# Patient Record
Sex: FEMALE | Race: WHITE
[De-identification: names, ages, dates, MRNs, and addresses within clinical notes are randomized per-mention and may not be internally consistent; named-entity substitution may affect disease eponyms.]

---

## 2019-01-26 ENCOUNTER — HOSPITAL ENCOUNTER (EMERGENCY)
Dept: HOSPITAL 25 - UCCORT | Age: 29
Discharge: HOME | End: 2019-01-26
Payer: COMMERCIAL

## 2019-01-26 VITALS — DIASTOLIC BLOOD PRESSURE: 68 MMHG | SYSTOLIC BLOOD PRESSURE: 120 MMHG

## 2019-01-26 DIAGNOSIS — S61.012A: Primary | ICD-10-CM
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
## 2019-01-26 DIAGNOSIS — Z23: ICD-10-CM
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
## 2019-01-26 PROCEDURE — G0463 HOSPITAL OUTPT CLINIC VISIT: HCPCS

## 2019-01-26 PROCEDURE — 99202 OFFICE O/P NEW SF 15 MIN: CPT

## 2019-01-26 PROCEDURE — 90471 IMMUNIZATION ADMIN: CPT

## 2019-01-26 PROCEDURE — 90715 TDAP VACCINE 7 YRS/> IM: CPT

## 2019-01-26 PROCEDURE — 12001 RPR S/N/AX/GEN/TRNK 2.5CM/<: CPT

## 2019-01-26 NOTE — UC
Laceration HPI





- HPI Summary


HPI Summary: 


Cut her left thumb this evening chopping vegetables. Unknown tetanus status





- History Of Current Complaint


Chief Complaint: UCLaceration


Stated Complaint: LEFT THUMB LAC


Time Seen by Provider: 01/26/19 20:33


Hx Obtained From: Patient


Hx Last Menstrual Period: 1/26/19


Laceration Location: Finger - left thumb


Mechanism Of Injury: Sharp Trauma


Onset/Duration: Sudden Onset, Still Present


Severity: Mild


Pain Intensity: 3


Aggravating Factors: Movement


Hands: 


  __________________________














  __________________________





 1 - laceration 1.9 cm





Related History: Dominant Hand Right





- Allergies/Home Medications


Allergies/Adverse Reactions: 


 Allergies











Allergy/AdvReac Type Severity Reaction Status Date / Time


 


No Known Allergies Allergy   Verified 01/26/19 20:18











Home Medications: 


 Home Medications





Adrenal Complex 1 tab PO DAILY 01/26/19 [History Confirmed 01/26/19]


Amino AC/Whey Prot Conc, Isol [Whey Protein Powder] 1 pow PO DAILY 01/26/19 [

History Confirmed 01/26/19]


Anxiety Tincture 1 udc PO DAILY PRN 01/26/19 [History Confirmed 01/26/19]


B1/B2/Niacin/B12/Protease [B-Complex with B-12 Tablet] 1 tab PO DAILY 01/26/19 [

History Confirmed 01/26/19]


Bio Feno F Vitamin 1 tab PO DAILY 01/26/19 [History Confirmed 01/26/19]


Black [place] Seed Oil 1 cap PO DAILY 01/26/19 [History Confirmed 01/26/19]


Calming Powder 1 udc PO DAILY PRN 01/26/19 [History Confirmed 01/26/19]


Cholecalciferol (Vitamin D3) [Vitamin D3] 1,000 unit PO DAILY 01/26/19 [History 

Confirmed 01/26/19]


Cool Digestive Fire 1 udc PO DAILY 01/26/19 [History Confirmed 01/26/19]


Digestive Biophenotype F 1 tab PO DAILY 01/26/19 [History Confirmed 01/26/19]


Melatonin 5 mg PO BEDTIME PRN 01/26/19 [History Confirmed 01/26/19]


Pro Wen Df 1 tab PO DAILY 01/26/19 [History Confirmed 01/26/19]


Progestafem 1 udc TOPICAL DAILY 01/26/19 [History Confirmed 01/26/19]


Rhodiola 1 tab PO DAILY 01/26/19 [History Confirmed 01/26/19]


Thyroid Tonic 1 cap PO DAILY 01/26/19 [History Confirmed 01/26/19]


Vit A and D3 in Cod Liver Oil [Cod Liver Oil Softgel] 1 cap PO DAILY 01/26/19 [

History Confirmed 01/26/19]


Vitex [place] 1 tab PO DAILY 01/26/19 [History Confirmed 01/26/19]











PMH/Surg Hx/FS Hx/Imm Hx


Other Endocrine History: PCOS





- Surgical History


Surgical History: Yes


Surgery Procedure, Year, and Place: wisdom teeth extractions





- Family History


Known Family History: Positive: Cardiac Disease, Hypertension, Diabetes





- Social History


Occupation: Employed Full-time


Lives: With Family


Alcohol Use: Rare


Substance Use Type: None


Smoking Status (MU): Never Smoked Tobacco





Review of Systems


All Other Systems Reviewed And Are Negative: Yes


Is Patient Immunocompromised?: No





Physical Exam


Triage Information Reviewed: Yes


Appearance: Well-Appearing, No Pain Distress, Well-Nourished


Vital Signs: 


 Initial Vital Signs











Temp  97.9 F   01/26/19 20:31


 


Pulse  67   01/26/19 20:31


 


Resp  18   01/26/19 20:31


 


BP  120/68   01/26/19 20:31


 


Pulse Ox  100   01/26/19 20:31











Vital Signs Reviewed: Yes


Eyes: Positive: Conjunctiva Clear


Neck exam: Normal


Respiratory Exam: Normal


Cardiovascular Exam: Normal


Musculoskeletal Exam: Normal


Neurological Exam: Normal


Psychological Exam: Normal


Skin: Positive: Other - laceration left thumb





Laceration Repair





- Laceration Repair


  ** 1


Description: Linear


Laceration Size After Repair: Length (cm) - 1.9 cm


Modified For Repair: No


Type Injection: Local


Anesthesia Used: 2.0% Lido


Additive Used (in ml): Epi


Cleansing Completed Via Routine Prep: Yes


Irrigation With Pressure Irrigation Device: Yes


Closure Material: Sutures


Closure Method: Single Layer - #5 running


Suture Of: Skin


Suture Type: Nylon - 4-0





Laceration Course/Dx





- Differential Dx - Laceration/Wound


Differental Diagnoses: Abrasion, Avulsion, Laceration





- Diagnosis


Provider Diagnosis: 


 Laceration of left thumb








Discharge





- Sign-Out/Discharge


Documenting (check all that apply): Patient Departure


All imaging exams completed and their final reports reviewed: No Studies





- Discharge Plan


Condition: Stable


Disposition: HOME


Patient Education Materials:  Finger Laceration (ED), Care For Your Stitches (ED

)


Referrals: 


Emelina Lopez PA [Primary Care Provider] -  (10 days suture removal)


Additional Instructions: 


Use antibiotic ointment and a band aid twice a day until the sutures are 

removed.





- Billing Disposition and Condition


Condition: STABLE


Disposition: Home